# Patient Record
Sex: FEMALE | Race: WHITE | NOT HISPANIC OR LATINO | Employment: FULL TIME | URBAN - METROPOLITAN AREA
[De-identification: names, ages, dates, MRNs, and addresses within clinical notes are randomized per-mention and may not be internally consistent; named-entity substitution may affect disease eponyms.]

---

## 2017-01-04 LAB — EXTERNAL GROUP B STREP ANTIGEN: POSITIVE

## 2017-01-31 ENCOUNTER — ANESTHESIA EVENT (INPATIENT)
Dept: LABOR AND DELIVERY | Facility: HOSPITAL | Age: 32
End: 2017-01-31
Payer: COMMERCIAL

## 2017-01-31 ENCOUNTER — ANESTHESIA (INPATIENT)
Dept: LABOR AND DELIVERY | Facility: HOSPITAL | Age: 32
End: 2017-01-31
Payer: COMMERCIAL

## 2017-01-31 ENCOUNTER — HOSPITAL ENCOUNTER (INPATIENT)
Facility: HOSPITAL | Age: 32
LOS: 1 days | Discharge: HOME | End: 2017-02-01
Attending: OBSTETRICS & GYNECOLOGY | Admitting: OBSTETRICS & GYNECOLOGY
Payer: COMMERCIAL

## 2017-01-31 ENCOUNTER — HOSPITAL ENCOUNTER (OUTPATIENT)
Dept: LABOR AND DELIVERY | Facility: HOSPITAL | Age: 32
Discharge: HOME | End: 2017-01-31
Payer: COMMERCIAL

## 2017-01-31 DIAGNOSIS — Z3A.40 40 WEEKS GESTATION OF PREGNANCY: Primary | ICD-10-CM

## 2017-01-31 PROBLEM — O98.819 GROUP B STREPTOCOCCAL INFECTION DURING PREGNANCY: Status: ACTIVE | Noted: 2017-01-31

## 2017-01-31 PROBLEM — B95.1 GROUP B STREPTOCOCCAL INFECTION DURING PREGNANCY: Status: ACTIVE | Noted: 2017-01-31

## 2017-01-31 LAB
ABO GROUP BLD: NORMAL
BASE EXCESS BLDCOA CALC-SCNC: -2.3 MMOL/L (ref 3–11)
BASE EXCESS BLDCOV CALC-SCNC: -3.8 MMOL/L (ref 1–9)
BASOPHILS # BLD AUTO: 0.01 THOUSANDS/ΜL (ref 0–0.1)
BASOPHILS NFR BLD AUTO: 0 % (ref 0–1)
BLD GP AB SCN SERPL QL: NEGATIVE
EOSINOPHIL # BLD AUTO: 0.1 THOUSAND/ΜL (ref 0–0.61)
EOSINOPHIL NFR BLD AUTO: 2 % (ref 0–6)
ERYTHROCYTE [DISTWIDTH] IN BLOOD BY AUTOMATED COUNT: 14.1 % (ref 11.6–15.1)
HCO3 BLDCOA-SCNC: 25.1 MMOL/L (ref 17.3–27.3)
HCO3 BLDCOV-SCNC: 22.4 MMOL/L (ref 12.2–28.6)
HCT VFR BLD AUTO: 34.8 % (ref 34.8–46.1)
HGB BLD-MCNC: 11.9 G/DL (ref 11.5–15.4)
LYMPHOCYTES # BLD AUTO: 1.1 THOUSANDS/ΜL (ref 0.6–4.47)
LYMPHOCYTES NFR BLD AUTO: 18 % (ref 14–44)
MCH RBC QN AUTO: 30.7 PG (ref 26.8–34.3)
MCHC RBC AUTO-ENTMCNC: 34.2 G/DL (ref 31.4–37.4)
MCV RBC AUTO: 90 FL (ref 82–98)
MONOCYTES # BLD AUTO: 0.19 THOUSAND/ΜL (ref 0.17–1.22)
MONOCYTES NFR BLD AUTO: 3 % (ref 4–12)
NEUTROPHILS # BLD AUTO: 4.78 THOUSANDS/ΜL (ref 1.85–7.62)
NEUTS SEG NFR BLD AUTO: 77 % (ref 43–75)
NRBC BLD AUTO-RTO: 0 /100 WBCS
O2 CT VFR BLDCOA CALC: 4.3 ML/DL
OXYHGB MFR BLDCOA: 20.4 %
OXYHGB MFR BLDCOV: 33.5 %
PCO2 BLDCOA: 53.4 MM[HG] (ref 30–60)
PCO2 BLDCOV: 44.3 MM HG (ref 27–43)
PH BLDCOA: 7.29 [PH] (ref 7.23–7.43)
PH BLDCOV: 7.32 [PH] (ref 7.19–7.49)
PLATELET # BLD AUTO: 163 THOUSANDS/UL (ref 149–390)
PMV BLD AUTO: 10.9 FL (ref 8.9–12.7)
PO2 BLDCOA: 13.7 MM HG (ref 5–25)
PO2 BLDCOV: 17.7 MM HG (ref 15–45)
RBC # BLD AUTO: 3.88 MILLION/UL (ref 3.81–5.12)
RH BLD: POSITIVE
SAO2 % BLDCOV: 7.4 ML/DL
WBC # BLD AUTO: 6.22 THOUSAND/UL (ref 4.31–10.16)

## 2017-01-31 PROCEDURE — 0HQ9XZZ REPAIR PERINEUM SKIN, EXTERNAL APPROACH: ICD-10-PCS | Performed by: OBSTETRICS & GYNECOLOGY

## 2017-01-31 PROCEDURE — 86850 RBC ANTIBODY SCREEN: CPT | Performed by: OBSTETRICS & GYNECOLOGY

## 2017-01-31 PROCEDURE — 82805 BLOOD GASES W/O2 SATURATION: CPT | Performed by: OBSTETRICS & GYNECOLOGY

## 2017-01-31 PROCEDURE — 10907ZC DRAINAGE OF AMNIOTIC FLUID, THERAPEUTIC FROM PRODUCTS OF CONCEPTION, VIA NATURAL OR ARTIFICIAL OPENING: ICD-10-PCS | Performed by: OBSTETRICS & GYNECOLOGY

## 2017-01-31 PROCEDURE — 86901 BLOOD TYPING SEROLOGIC RH(D): CPT | Performed by: OBSTETRICS & GYNECOLOGY

## 2017-01-31 PROCEDURE — 85025 COMPLETE CBC W/AUTO DIFF WBC: CPT | Performed by: OBSTETRICS & GYNECOLOGY

## 2017-01-31 PROCEDURE — 86900 BLOOD TYPING SEROLOGIC ABO: CPT | Performed by: OBSTETRICS & GYNECOLOGY

## 2017-01-31 PROCEDURE — 3E033VJ INTRODUCTION OF OTHER HORMONE INTO PERIPHERAL VEIN, PERCUTANEOUS APPROACH: ICD-10-PCS | Performed by: OBSTETRICS & GYNECOLOGY

## 2017-01-31 PROCEDURE — 86592 SYPHILIS TEST NON-TREP QUAL: CPT | Performed by: OBSTETRICS & GYNECOLOGY

## 2017-01-31 RX ORDER — OXYCODONE HYDROCHLORIDE AND ACETAMINOPHEN 5; 325 MG/1; MG/1
1 TABLET ORAL EVERY 4 HOURS PRN
Status: DISCONTINUED | OUTPATIENT
Start: 2017-01-31 | End: 2017-02-01 | Stop reason: HOSPADM

## 2017-01-31 RX ORDER — FERROUS SULFATE 325(65) MG
325 TABLET ORAL
Status: DISCONTINUED | OUTPATIENT
Start: 2017-02-01 | End: 2017-02-01 | Stop reason: HOSPADM

## 2017-01-31 RX ORDER — IBUPROFEN 600 MG/1
600 TABLET ORAL EVERY 6 HOURS PRN
Status: DISCONTINUED | OUTPATIENT
Start: 2017-01-31 | End: 2017-02-01 | Stop reason: HOSPADM

## 2017-01-31 RX ORDER — SODIUM CHLORIDE, SODIUM LACTATE, POTASSIUM CHLORIDE, CALCIUM CHLORIDE 600; 310; 30; 20 MG/100ML; MG/100ML; MG/100ML; MG/100ML
125 INJECTION, SOLUTION INTRAVENOUS CONTINUOUS
Status: DISCONTINUED | OUTPATIENT
Start: 2017-01-31 | End: 2017-01-31

## 2017-01-31 RX ORDER — MELATONIN
2000 DAILY
Status: DISCONTINUED | OUTPATIENT
Start: 2017-02-01 | End: 2017-02-01 | Stop reason: HOSPADM

## 2017-01-31 RX ORDER — DOCUSATE SODIUM 100 MG/1
100 CAPSULE, LIQUID FILLED ORAL 2 TIMES DAILY
Status: DISCONTINUED | OUTPATIENT
Start: 2017-01-31 | End: 2017-02-01 | Stop reason: HOSPADM

## 2017-01-31 RX ORDER — SENNOSIDES 8.6 MG
1 TABLET ORAL
Status: DISCONTINUED | OUTPATIENT
Start: 2017-01-31 | End: 2017-02-01 | Stop reason: HOSPADM

## 2017-01-31 RX ORDER — DIAPER,BRIEF,INFANT-TODD,DISP
1 EACH MISCELLANEOUS AS NEEDED
Status: DISCONTINUED | OUTPATIENT
Start: 2017-01-31 | End: 2017-02-01 | Stop reason: HOSPADM

## 2017-01-31 RX ORDER — LEVOTHYROXINE SODIUM 0.03 MG/1
25 TABLET ORAL
Status: DISCONTINUED | OUTPATIENT
Start: 2017-02-01 | End: 2017-02-01 | Stop reason: HOSPADM

## 2017-01-31 RX ORDER — OXYTOCIN/RINGER'S LACTATE 30/500 ML
1-30 PLASTIC BAG, INJECTION (ML) INTRAVENOUS
Status: DISCONTINUED | OUTPATIENT
Start: 2017-01-31 | End: 2017-01-31

## 2017-01-31 RX ORDER — ROPIVACAINE HYDROCHLORIDE 2 MG/ML
INJECTION, SOLUTION EPIDURAL; INFILTRATION; PERINEURAL CONTINUOUS PRN
Status: DISCONTINUED | OUTPATIENT
Start: 2017-01-31 | End: 2017-01-31 | Stop reason: SURG

## 2017-01-31 RX ORDER — LEVOTHYROXINE SODIUM 0.03 MG/1
25 TABLET ORAL
Status: DISCONTINUED | OUTPATIENT
Start: 2017-01-31 | End: 2017-01-31

## 2017-01-31 RX ORDER — OXYCODONE HYDROCHLORIDE AND ACETAMINOPHEN 5; 325 MG/1; MG/1
2 TABLET ORAL EVERY 4 HOURS PRN
Status: DISCONTINUED | OUTPATIENT
Start: 2017-01-31 | End: 2017-02-01 | Stop reason: HOSPADM

## 2017-01-31 RX ORDER — ACETAMINOPHEN 325 MG/1
650 TABLET ORAL EVERY 6 HOURS PRN
Status: DISCONTINUED | OUTPATIENT
Start: 2017-01-31 | End: 2017-02-01 | Stop reason: HOSPADM

## 2017-01-31 RX ADMIN — DOCUSATE SODIUM 100 MG: 100 CAPSULE, LIQUID FILLED ORAL at 20:17

## 2017-01-31 RX ADMIN — ROPIVACAINE HYDROCHLORIDE 10 ML/HR: 2 INJECTION, SOLUTION EPIDURAL; INFILTRATION at 14:13

## 2017-01-31 RX ADMIN — Medication 2 MILLI-UNITS/MIN: at 09:00

## 2017-01-31 RX ADMIN — WITCH HAZEL 1 PAD: 500 SOLUTION RECTAL; TOPICAL at 20:20

## 2017-01-31 RX ADMIN — SODIUM CHLORIDE 2.5 MILLION UNITS: 9 INJECTION, SOLUTION INTRAVENOUS at 13:10

## 2017-01-31 RX ADMIN — HYDROCORTISONE 1 APPLICATION: 10 CREAM TOPICAL at 20:20

## 2017-01-31 RX ADMIN — IBUPROFEN 600 MG: 600 TABLET ORAL at 20:17

## 2017-01-31 RX ADMIN — SODIUM CHLORIDE, SODIUM LACTATE, POTASSIUM CHLORIDE, AND CALCIUM CHLORIDE 125 ML/HR: .6; .31; .03; .02 INJECTION, SOLUTION INTRAVENOUS at 13:32

## 2017-01-31 RX ADMIN — SODIUM CHLORIDE, SODIUM LACTATE, POTASSIUM CHLORIDE, AND CALCIUM CHLORIDE 999 ML/HR: .6; .31; .03; .02 INJECTION, SOLUTION INTRAVENOUS at 08:20

## 2017-01-31 RX ADMIN — SODIUM CHLORIDE 5 MILLION UNITS: 0.9 INJECTION, SOLUTION INTRAVENOUS at 08:49

## 2017-01-31 RX ADMIN — BENZOCAINE AND MENTHOL 1 APPLICATION: 20; .5 SPRAY TOPICAL at 20:20

## 2017-01-31 RX ADMIN — LEVOTHYROXINE SODIUM 25 MCG: 25 TABLET ORAL at 08:29

## 2017-02-01 VITALS
WEIGHT: 156 LBS | RESPIRATION RATE: 18 BRPM | DIASTOLIC BLOOD PRESSURE: 71 MMHG | HEART RATE: 87 BPM | TEMPERATURE: 98.8 F | HEIGHT: 61 IN | BODY MASS INDEX: 29.45 KG/M2 | OXYGEN SATURATION: 99 % | SYSTOLIC BLOOD PRESSURE: 112 MMHG

## 2017-02-01 LAB
BASOPHILS # BLD AUTO: 0.01 THOUSANDS/ΜL (ref 0–0.1)
BASOPHILS NFR BLD AUTO: 0 % (ref 0–1)
EOSINOPHIL # BLD AUTO: 0.09 THOUSAND/ΜL (ref 0–0.61)
EOSINOPHIL NFR BLD AUTO: 1 % (ref 0–6)
ERYTHROCYTE [DISTWIDTH] IN BLOOD BY AUTOMATED COUNT: 14.3 % (ref 11.6–15.1)
HCT VFR BLD AUTO: 32.1 % (ref 34.8–46.1)
HGB BLD-MCNC: 10.9 G/DL (ref 11.5–15.4)
LYMPHOCYTES # BLD AUTO: 1.24 THOUSANDS/ΜL (ref 0.6–4.47)
LYMPHOCYTES NFR BLD AUTO: 16 % (ref 14–44)
MCH RBC QN AUTO: 31.1 PG (ref 26.8–34.3)
MCHC RBC AUTO-ENTMCNC: 34 G/DL (ref 31.4–37.4)
MCV RBC AUTO: 92 FL (ref 82–98)
MONOCYTES # BLD AUTO: 0.62 THOUSAND/ΜL (ref 0.17–1.22)
MONOCYTES NFR BLD AUTO: 8 % (ref 4–12)
NEUTROPHILS # BLD AUTO: 5.61 THOUSANDS/ΜL (ref 1.85–7.62)
NEUTS SEG NFR BLD AUTO: 75 % (ref 43–75)
NRBC BLD AUTO-RTO: 0 /100 WBCS
PLATELET # BLD AUTO: 127 THOUSANDS/UL (ref 149–390)
PMV BLD AUTO: 11.5 FL (ref 8.9–12.7)
RBC # BLD AUTO: 3.51 MILLION/UL (ref 3.81–5.12)
RPR SER QL: NORMAL
WBC # BLD AUTO: 7.6 THOUSAND/UL (ref 4.31–10.16)

## 2017-02-01 PROCEDURE — 85025 COMPLETE CBC W/AUTO DIFF WBC: CPT | Performed by: OBSTETRICS & GYNECOLOGY

## 2017-02-01 RX ORDER — ACETAMINOPHEN 325 MG/1
650 TABLET ORAL EVERY 6 HOURS PRN
Qty: 30 TABLET | Refills: 0
Start: 2017-02-01 | End: 2017-03-03

## 2017-02-01 RX ORDER — DIAPER,BRIEF,INFANT-TODD,DISP
1 EACH MISCELLANEOUS AS NEEDED
Qty: 30 G | Refills: 0
Start: 2017-02-01 | End: 2019-11-12 | Stop reason: ALTCHOICE

## 2017-02-01 RX ORDER — IBUPROFEN 600 MG/1
600 TABLET ORAL EVERY 6 HOURS PRN
Qty: 30 TABLET | Refills: 0
Start: 2017-02-01 | End: 2019-11-12 | Stop reason: ALTCHOICE

## 2017-02-01 RX ORDER — VALACYCLOVIR HYDROCHLORIDE 1 G/1
2000 TABLET, FILM COATED ORAL EVERY 12 HOURS
Refills: 0
Start: 2017-02-01 | End: 2019-11-12

## 2017-02-01 RX ORDER — VALACYCLOVIR HYDROCHLORIDE 500 MG/1
2000 TABLET, FILM COATED ORAL EVERY 12 HOURS
Status: DISCONTINUED | OUTPATIENT
Start: 2017-02-01 | End: 2017-02-01 | Stop reason: HOSPADM

## 2017-02-01 RX ADMIN — VALACYCLOVIR 2000 MG: 500 TABLET, FILM COATED ORAL at 10:59

## 2017-02-01 RX ADMIN — DOCUSATE SODIUM 100 MG: 100 CAPSULE, LIQUID FILLED ORAL at 08:12

## 2017-02-01 RX ADMIN — DOCUSATE SODIUM 100 MG: 100 CAPSULE, LIQUID FILLED ORAL at 17:48

## 2017-02-01 RX ADMIN — Medication 325 MG: at 06:30

## 2017-02-01 RX ADMIN — IBUPROFEN 600 MG: 600 TABLET ORAL at 14:37

## 2017-02-01 RX ADMIN — IBUPROFEN 600 MG: 600 TABLET ORAL at 08:12

## 2017-02-01 RX ADMIN — LEVOTHYROXINE SODIUM 25 MCG: 25 TABLET ORAL at 06:30

## 2017-02-08 ENCOUNTER — TELEPHONE (OUTPATIENT)
Dept: LABOR AND DELIVERY | Facility: HOSPITAL | Age: 32
End: 2017-02-08

## 2017-02-10 LAB — PLACENTA IN STORAGE: NORMAL

## 2017-02-20 ENCOUNTER — GENERIC CONVERSION - ENCOUNTER (OUTPATIENT)
Dept: OTHER | Facility: OTHER | Age: 32
End: 2017-02-20

## 2018-01-11 NOTE — RESULT NOTES
Verified Results  (1) PLACENTA IN STORAGE [id number] 06:46PM Echo Ramos     Test Name Result Flag Reference   PLACENTA IN STORAGE Placenta Discarded

## 2019-11-12 ENCOUNTER — ANNUAL EXAM (OUTPATIENT)
Dept: OBGYN CLINIC | Facility: CLINIC | Age: 34
End: 2019-11-12
Payer: COMMERCIAL

## 2019-11-12 VITALS
HEIGHT: 61 IN | BODY MASS INDEX: 22.28 KG/M2 | WEIGHT: 118 LBS | DIASTOLIC BLOOD PRESSURE: 60 MMHG | SYSTOLIC BLOOD PRESSURE: 100 MMHG

## 2019-11-12 DIAGNOSIS — Z01.419 ENCOUNTER FOR WELL WOMAN EXAM: Primary | ICD-10-CM

## 2019-11-12 DIAGNOSIS — Z12.4 ROUTINE CERVICAL SMEAR: ICD-10-CM

## 2019-11-12 PROCEDURE — 99395 PREV VISIT EST AGE 18-39: CPT | Performed by: PHYSICIAN ASSISTANT

## 2019-11-12 NOTE — PROGRESS NOTES
Assessment/Plan:    No problem-specific Assessment & Plan notes found for this encounter  Diagnoses and all orders for this visit:    Encounter for well woman exam    Other orders  -     Discontinue: Cholecalciferol 25 MCG (1000 UT) tablet; Take 1,000 Units by mouth daily          Subjective:      Patient ID: Arya King is a 37 y o  female  Pt presents for her annual exam today--  She has no complaints  No changes except benign liver lesions dx on imaging few months ago--[facility]  She has regular bleeding, no pelvic pain  Bowel and bladder are regular  No breast concerns today    pap today  Pt's request        The following portions of the patient's history were reviewed and updated as appropriate: allergies, current medications, past family history, past medical history, past social history, past surgical history and problem list     Review of Systems   Constitutional: Negative for chills, fever and unexpected weight change  Gastrointestinal: Negative for abdominal pain, blood in stool, constipation and diarrhea  Genitourinary: Negative  Objective:      /60 (BP Location: Left arm, Patient Position: Sitting, Cuff Size: Standard)   Ht 5' 1" (1 549 m)   Wt 53 5 kg (118 lb)   LMP 10/16/2019 (Exact Date)   BMI 22 30 kg/m²          Physical Exam   Constitutional: She appears well-developed and well-nourished  HENT:   Head: Normocephalic and atraumatic  Neck: Normal range of motion  Pulmonary/Chest: Right breast exhibits no inverted nipple, no mass, no nipple discharge and no skin change  Left breast exhibits no inverted nipple, no mass, no nipple discharge and no skin change  Abdominal: Soft  Genitourinary: Vagina normal and uterus normal  Pelvic exam was performed with patient supine  There is no rash, tenderness or lesion on the right labia  There is no rash, tenderness or lesion on the left labia   Cervix exhibits no motion tenderness, no discharge and no friability  Right adnexum displays no mass, no tenderness and no fullness  Left adnexum displays no mass, no tenderness and no fullness  Lymphadenopathy: No inguinal adenopathy noted on the right or left side  Nursing note and vitals reviewed

## 2019-11-12 NOTE — PROGRESS NOTES
The patient is here for a yearly  The patient is not due for a pap smear but her insurance covers the pap smear every year so she is requesting one  The patient has regular periods  No vaginal or urinary issues  No breast concerns

## 2019-11-22 LAB — THIN PREP CVX: NORMAL

## 2020-12-14 ENCOUNTER — ANNUAL EXAM (OUTPATIENT)
Dept: OBGYN CLINIC | Facility: CLINIC | Age: 35
End: 2020-12-14
Payer: COMMERCIAL

## 2020-12-14 VITALS
HEIGHT: 61 IN | SYSTOLIC BLOOD PRESSURE: 110 MMHG | BODY MASS INDEX: 22.84 KG/M2 | WEIGHT: 121 LBS | DIASTOLIC BLOOD PRESSURE: 66 MMHG

## 2020-12-14 DIAGNOSIS — Z01.419 CERVICAL SMEAR, AS PART OF ROUTINE GYNECOLOGICAL EXAMINATION: ICD-10-CM

## 2020-12-14 DIAGNOSIS — Z12.39 ENCOUNTER FOR SCREENING BREAST EXAMINATION: ICD-10-CM

## 2020-12-14 DIAGNOSIS — Z01.419 ENCOUNTER FOR WELL WOMAN EXAM: Primary | ICD-10-CM

## 2020-12-14 PROCEDURE — G0145 SCR C/V CYTO,THINLAYER,RESCR: HCPCS | Performed by: PHYSICIAN ASSISTANT

## 2020-12-14 PROCEDURE — 99395 PREV VISIT EST AGE 18-39: CPT | Performed by: PHYSICIAN ASSISTANT

## 2020-12-18 LAB
LAB AP GYN PRIMARY INTERPRETATION: NORMAL
Lab: NORMAL

## 2021-01-24 ENCOUNTER — OFFICE VISIT (OUTPATIENT)
Dept: URGENT CARE | Age: 36
End: 2021-01-24
Payer: COMMERCIAL

## 2021-01-24 VITALS
RESPIRATION RATE: 16 BRPM | WEIGHT: 117 LBS | HEIGHT: 61 IN | OXYGEN SATURATION: 100 % | BODY MASS INDEX: 22.09 KG/M2 | TEMPERATURE: 97.4 F | HEART RATE: 62 BPM

## 2021-01-24 DIAGNOSIS — Z20.822 EXPOSURE TO COVID-19 VIRUS: ICD-10-CM

## 2021-01-24 DIAGNOSIS — B34.9 VIRAL SYNDROME: Primary | ICD-10-CM

## 2021-01-24 PROCEDURE — U0005 INFEC AGEN DETEC AMPLI PROBE: HCPCS | Performed by: PHYSICIAN ASSISTANT

## 2021-01-24 PROCEDURE — 99212 OFFICE O/P EST SF 10 MIN: CPT | Performed by: PHYSICIAN ASSISTANT

## 2021-01-24 PROCEDURE — U0003 INFECTIOUS AGENT DETECTION BY NUCLEIC ACID (DNA OR RNA); SEVERE ACUTE RESPIRATORY SYNDROME CORONAVIRUS 2 (SARS-COV-2) (CORONAVIRUS DISEASE [COVID-19]), AMPLIFIED PROBE TECHNIQUE, MAKING USE OF HIGH THROUGHPUT TECHNOLOGIES AS DESCRIBED BY CMS-2020-01-R: HCPCS | Performed by: PHYSICIAN ASSISTANT

## 2021-01-24 RX ORDER — CITALOPRAM 10 MG/1
10 TABLET ORAL DAILY
COMMUNITY

## 2021-01-24 NOTE — PROGRESS NOTES
[address] [employer] [facility]        NAME: Xiomy Donahue is a 38 y o  female  : 1985    MRN: [id number]  DATE: 2021  TIME: 1:48 PM    Assessment and Plan   Viral syndrome [B34 9]  1  Viral syndrome  [facility] Coron[facility]ov[facility]   2  Exposure to COVID-19 virus           Patient Instructions     COVID swab collected today, test results pending  Check MyChart and call in 2-3 days for test results  Results may take up to 7-10 days  Quarantine guidelines discussed  Follow-up with PCP in the next 1-2 days for re-evaluation  Go to the ED if any fevers, unable to stay hydrated, abdominal pain, chest pain, shortness of breath, wheezing, chest tightness, cough or cold symptoms, new or worsening symptoms or other concerning symptoms  Chief Complaint     Chief Complaint   Patient presents with    COVID testing     Pt complaining of congestion and headache x1 day  Her  tested positive for COVID  History of Present Illness       77-year-old female presents for COVID testing  States she has had nasal congestion, runny nose and a mild sinus congestion headache since yesterday  States her  tested positive for Coronavirus/had a direct exposure  Last contact with  was yesterday- he has now been quarantining  Denies any fever, chills, loss of taste/, cough or other cold-like symptoms  Denies any chest pain, shortness of breath, chest tightness, GI/ symptoms or other complaints  Review of Systems   Review of Systems   Constitutional: Negative for activity change, appetite change, chills, fatigue and fever  HENT: Positive for congestion and rhinorrhea  Negative for ear pain, facial swelling, postnasal drip, sinus pressure, sore throat, trouble swallowing and voice change  Eyes: Negative for discharge, itching and visual disturbance  Respiratory: Negative for cough, chest tightness, shortness of breath and wheezing      Cardiovascular: Negative for chest pain  Gastrointestinal: Negative for abdominal pain, diarrhea, nausea and vomiting  Musculoskeletal: Negative for back pain and neck pain  Skin: Negative for rash  Neurological: Positive for headaches (mild, sinus congestion headache)  Negative for dizziness, syncope, weakness and numbness  All other systems reviewed and are negative          Current Medications       Current Outpatient Medications:     citalopram (CeleXA) 10 mg tablet, Take 10 mg by mouth daily, Disp: , Rfl:     albuterol (PROVENTIL HFA,VENTOLIN HFA) 90 mcg/act inhaler, Inhale 2 puffs every 6 (six) hours as needed for wheezing, Disp: , Rfl:     cholecalciferol (VITAMIN D3) 1,000 units tablet, Take 2,000 Units by mouth daily, Disp: , Rfl:     levothyroxine 25 mcg tablet, Take 25 mcg by mouth daily, Disp: , Rfl:     valACYclovir (VALTREX) 1,000 mg tablet, Take 2 tablets by mouth every 12 (twelve) hours for 1 dose, Disp: , Rfl: 0    Current Allergies     Allergies as of 2021 - Reviewed 2021   Allergen Reaction Noted    Cat hair extract  2019            The following portions of the patient's history were reviewed and updated as appropriate: allergies, current medications, past family history, past medical history, past social history, past surgical history and problem list      Past Medical History:   Diagnosis Date    Asthma     Disease of thyroid gland     Esophageal hernia      2 para 2     HPV in female     Papanicolaou smear 10/24/2018    NEG    Varicella     needs vaccine       Past Surgical History:   Procedure Laterality Date    COLONOSCOPY      COLPOSCOPY      FERTILITY SURGERY      GYNECOLOGIC CRYOSURGERY      INDUCED       MYRINGOTOMY      VAGINAL DELIVERY         Family History   Problem Relation Age of Onset    Arthritis Mother     Cancer Father     COPD Father     Depression Father     Diabetes Father     Mental illness Father     Leukemia Father    Barron Gallardo Cancer Maternal Uncle     Cancer Paternal Aunt     Mental illness Paternal Aunt     Mental illness Paternal Uncle     Arthritis Maternal Grandmother     Heart disease Maternal Grandmother     Miscarriages / Stillbirths Maternal Grandmother     Cancer Maternal Grandfather     Diabetes Maternal Grandfather     Hearing loss Maternal Grandfather     Stroke Maternal Grandfather     Bone cancer Maternal Grandfather [de-identified]    Hearing loss Paternal Grandfather     Stroke Paternal Grandfather     Cancer Maternal Uncle     Cancer Paternal Uncle          Medications have been verified  Objective   Pulse 62   Temp (!) 97 4 °F (36 3 °C) (Temporal)   Resp 16   Ht 5' 1" (1 549 m)   Wt 53 1 kg (117 lb)   SpO2 100%   BMI 22 11 kg/m²        Physical Exam     Physical Exam  Vitals signs and nursing note reviewed  Constitutional:       General: She is not in acute distress  Appearance: Normal appearance  She is well-developed  She is not toxic-appearing  HENT:      Head: Normocephalic and atraumatic  Right Ear: Tympanic membrane normal       Left Ear: Tympanic membrane normal       Mouth/Throat:      Mouth: Mucous membranes are moist       Pharynx: Oropharynx is clear  Uvula midline  No oropharyngeal exudate, posterior oropharyngeal erythema or uvula swelling  Eyes:      Extraocular Movements: Extraocular movements intact  Pupils: Pupils are equal, round, and reactive to light  Neck:      Musculoskeletal: Normal range of motion and neck supple  No neck rigidity  Cardiovascular:      Rate and Rhythm: Normal rate and regular rhythm  Heart sounds: Normal heart sounds  Pulmonary:      Effort: Pulmonary effort is normal  No respiratory distress  Breath sounds: Normal breath sounds  No wheezing  Skin:     Capillary Refill: Capillary refill takes less than 2 seconds  Neurological:      Mental Status: She is alert and oriented to person, place, and time     Psychiatric: Behavior: Behavior normal

## 2021-01-24 NOTE — PATIENT INSTRUCTIONS
COVID swab collected today, test results pending  Check MyChart and call in 2-3 days for test results  Results may take up to 7-10 days  Quarantine guidelines discussed  Follow-up with PCP in the next 1-2 days for re-evaluation  Go to the ED if any fevers, unable to stay hydrated, abdominal pain, chest pain, shortness of breath, wheezing, chest tightness, cough or cold symptoms, new or worsening symptoms or other concerning symptoms  [address]    Your healthcare provider and/or public health staff have evaluated you and have determined that you do not need to remain in the hospital at this time  At this time you can be isolated at home where you will be monitored by staff from your local or state health department  You should carefully follow the prevention and isolation steps below until a healthcare provider or local or state health department says that you can return to your normal activities  Stay home except to get medical care    People who are mildly ill with COVID-19 are able to isolate at home during their illness  You should restrict activities outside your home, except for getting medical care  Do not go to work, school, or public areas  Avoid using public transportation, ride-sharing, or taxis  Separate yourself from other people and animals in your home    People: As much as possible, you should stay in a specific room and away from other people in your home  Also, you should use a separate bathroom, if available  Animals: You should restrict contact with pets and other animals while you are sick with COVID-19, just like you would around other people  Although there have not been reports of pets or other animals becoming sick with COVID-19, it is still recommended that people sick with COVID-19 limit contact with animals until more information is known about the virus  When possible, have another member of your household care for your animals while you are sick   If you are sick with COVID-19, avoid contact with your pet, including petting, snuggling, being kissed or licked, and sharing food  If you must care for your pet or be around animals while you are sick, wash your hands before and after you interact with pets and wear a facemask  See COVID-19 and Animals for more information  Call ahead before visiting your doctor    If you have a medical appointment, call the healthcare provider and tell them that you have or may have COVID-19  This will help the healthcare providers office take steps to keep other people from getting infected or exposed  Wear a facemask    You should wear a facemask when you are around other people (e g , sharing a room or vehicle) or pets and before you enter a healthcare providers office  If you are not able to wear a facemask (for example, because it causes trouble breathing), then people who live with you should not stay in the same room with you, or they should wear a facemask if they enter your room  Cover your coughs and sneezes    Cover your mouth and nose with a tissue when you cough or sneeze  Throw used tissues in a lined trash can  Immediately wash your hands with soap and water for at least 20 seconds or, if soap and water are not available, clean your hands with an alcohol-based hand  that contains at least 60% alcohol  Clean your hands often    Wash your hands often with soap and water for at least 20 seconds, especially after blowing your nose, coughing, or sneezing; going to the bathroom; and before eating or preparing food  If soap and water are not readily available, use an alcohol-based hand  with at least 60% alcohol, covering all surfaces of your hands and rubbing them together until they feel dry  Soap and water are the best option if hands are visibly dirty  Avoid touching your eyes, nose, and mouth with unwashed hands      Avoid sharing personal household items    You should not share dishes, drinking glasses, cups, eating utensils, towels, or bedding with other people or pets in your home  After using these items, they should be washed thoroughly with soap and water  Clean all high-touch surfaces everyday    High touch surfaces include counters, tabletops, doorknobs, bathroom fixtures, toilets, phones, keyboards, tablets, and bedside tables  Also, clean any surfaces that may have blood, stool, or body fluids on them  Use a household cleaning spray or wipe, according to the label instructions  Labels contain instructions for safe and effective use of the cleaning product including precautions you should take when applying the product, such as wearing gloves and making sure you have good ventilation during use of the product  Monitor your symptoms    Seek prompt medical attention if your illness is worsening (e g , difficulty breathing)  Before seeking care, call your healthcare provider and tell them that you have, or are being evaluated for, COVID-19  Put on a facemask before you enter the facility  These steps will help the healthcare providers office to keep other people in the office or waiting room from getting infected or exposed  Ask your healthcare provider to call the local or [facility] health department  Persons who are placed under active monitoring or facilitated self-monitoring should follow instructions provided by their local health department or occupational health professionals, as appropriate  If you have a medical emergency and need to call 911, notify the dispatch personnel that you have, or are being evaluated for COVID-19  If possible, put on a facemask before emergency medical services arrive      Discontinuing home isolation    Patients with confirmed COVID-19 should remain under home isolation precautions until the following conditions are met:   - They have had no fever for at least 24 hours (that is one full day of no fever without the use medicine that reduces fevers)  AND  - other symptoms have improved (for example, when their cough or shortness of breath have improved)  AND  - If had mild or moderate illness, at least 10 days have passed since their symptoms first appeared or if severe illness (needed oxygen) or immunosuppressed, at least 20 days have passed since symptoms first appeared  Patients with confirmed COVID-19 should also notify close contacts (including their workplace) and ask that they self-quarantine  Currently, close contact is defined as being within 6 feet for 15 minutes or more from the period 24 hours starting 48 hours before symptom onset to the time at which the patient went into isolation  Close contacts of patients diagnosed with COVID-19 should be instructed by the patient to self-quarantine for 14 days from the last time of their last contact with the patient       Source: [employer]

## 2021-01-25 LAB — SARS-COV-2 RNA RESP QL NAA+PROBE: NEGATIVE

## 2022-01-26 ENCOUNTER — ANNUAL EXAM (OUTPATIENT)
Dept: OBGYN CLINIC | Facility: CLINIC | Age: 37
End: 2022-01-26
Payer: COMMERCIAL

## 2022-01-26 VITALS
SYSTOLIC BLOOD PRESSURE: 100 MMHG | BODY MASS INDEX: 22.66 KG/M2 | WEIGHT: 120 LBS | HEIGHT: 61 IN | DIASTOLIC BLOOD PRESSURE: 60 MMHG

## 2022-01-26 DIAGNOSIS — Z01.419 ENCOUNTER FOR ANNUAL ROUTINE GYNECOLOGICAL EXAMINATION: ICD-10-CM

## 2022-01-26 DIAGNOSIS — Z01.419 ROUTINE GYNECOLOGICAL EXAMINATION: Primary | ICD-10-CM

## 2022-01-26 DIAGNOSIS — Z11.51 SCREENING FOR HPV (HUMAN PAPILLOMAVIRUS): ICD-10-CM

## 2022-01-26 PROCEDURE — 99395 PREV VISIT EST AGE 18-39: CPT | Performed by: OBSTETRICS & GYNECOLOGY

## 2022-01-26 PROCEDURE — G0145 SCR C/V CYTO,THINLAYER,RESCR: HCPCS | Performed by: OBSTETRICS & GYNECOLOGY

## 2022-01-26 PROCEDURE — G0476 HPV COMBO ASSAY CA SCREEN: HCPCS | Performed by: OBSTETRICS & GYNECOLOGY

## 2022-01-26 NOTE — PROGRESS NOTES
Assessment/Plan:    Normal breast and gyn exam  COVID vaccine    Plan:  Check Pap smear  Continue healthy diet exercise  Continue vitamin-C vitamin-D and zinc     Subjective:      Patient ID: Jamee Monreal is a 43 y o  female status for yearly exam no complaints  Patient has regular menstrual cycles  Patient denies any breast bowel or bladder issues  No change in family history  Medications reviewed  Review of Systems   Constitutional: Negative  Negative for fatigue, fever and unexpected weight change  HENT: Negative  Eyes: Negative  Respiratory: Negative  Negative for chest tightness, shortness of breath, wheezing and stridor  Cardiovascular: Negative  Negative for chest pain, palpitations and leg swelling  Gastrointestinal: Negative  Negative for abdominal pain, blood in stool, diarrhea, nausea, rectal pain and vomiting  Endocrine: Negative  Genitourinary: Negative for dysuria, frequency, vaginal bleeding, vaginal discharge and vaginal pain  Musculoskeletal: Negative  Skin: Negative  Allergic/Immunologic: Negative  Neurological: Negative  Hematological: Negative  Psychiatric/Behavioral: Negative  All other systems reviewed and are negative  Objective:      /60   Ht 5' 1" (1 549 m)   Wt 54 4 kg (120 lb)   LMP 2022 (Exact Date)   BMI 22 67 kg/m²          Physical Exam  Constitutional:       Appearance: She is well-developed  HENT:      Head: Normocephalic and atraumatic  Neck:      Thyroid: No thyromegaly  Trachea: No tracheal deviation  Cardiovascular:      Rate and Rhythm: Normal rate and regular rhythm  Heart sounds: Normal heart sounds  Pulmonary:      Effort: Pulmonary effort is normal  No respiratory distress  Breath sounds: Normal breath sounds  No stridor  No wheezing or rales  Chest:      Chest wall: No tenderness     Breasts: Breasts are symmetrical       Right: No inverted nipple, mass, nipple discharge, skin change or tenderness  Left: No inverted nipple, mass, nipple discharge, skin change or tenderness  Abdominal:      General: Bowel sounds are normal  There is no distension  Palpations: Abdomen is soft  There is no mass  Tenderness: There is no abdominal tenderness  There is no guarding or rebound  Hernia: No hernia is present  There is no hernia in the left inguinal area  Genitourinary:     Labia:         Right: No rash, tenderness, lesion or injury  Left: No rash, tenderness, lesion or injury  Vagina: Normal  No signs of injury and foreign body  No vaginal discharge, erythema, tenderness or bleeding  Cervix: No cervical motion tenderness, discharge or friability  Uterus: Not deviated, not enlarged, not fixed and not tender  Adnexa:         Right: No mass, tenderness or fullness  Left: No mass, tenderness or fullness  Rectum: No mass, anal fissure, external hemorrhoid or internal hemorrhoid  Musculoskeletal:      Cervical back: Normal range of motion and neck supple  Lymphadenopathy:      Lower Body: No right inguinal adenopathy  No left inguinal adenopathy  Skin:     General: Skin is warm and dry  Neurological:      Mental Status: She is alert and oriented to person, place, and time  Psychiatric:         Behavior: Behavior normal          Thought Content:  Thought content normal          Judgment: Judgment normal

## 2022-01-26 NOTE — PROGRESS NOTES
The patient is here for a yearly  The patient's insurance covers the pap smear every year  11/12/19 Normal Pap  10/24/18 Normal Pap, Negative HPV  7/6/16 Normal Pap, Negative HPV  The patient has regular periods  No vaginal, bowel, bladder or breast problems

## 2022-01-28 LAB
HPV HR 12 DNA CVX QL NAA+PROBE: NEGATIVE
HPV16 DNA CVX QL NAA+PROBE: NEGATIVE
HPV18 DNA CVX QL NAA+PROBE: NEGATIVE

## 2022-02-02 LAB
LAB AP GYN PRIMARY INTERPRETATION: NORMAL
Lab: NORMAL

## 2023-01-30 ENCOUNTER — ANNUAL EXAM (OUTPATIENT)
Dept: GYNECOLOGY | Facility: CLINIC | Age: 38
End: 2023-01-30

## 2023-01-30 DIAGNOSIS — B96.89 BV (BACTERIAL VAGINOSIS): ICD-10-CM

## 2023-01-30 DIAGNOSIS — Z12.31 SCREENING MAMMOGRAM FOR BREAST CANCER: Primary | ICD-10-CM

## 2023-01-30 DIAGNOSIS — Z11.3 SCREENING FOR STDS (SEXUALLY TRANSMITTED DISEASES): ICD-10-CM

## 2023-01-30 DIAGNOSIS — N89.8 VAGINAL ODOR: ICD-10-CM

## 2023-01-30 DIAGNOSIS — N76.0 BV (BACTERIAL VAGINOSIS): ICD-10-CM

## 2023-01-30 DIAGNOSIS — Z01.419 ENCOUNTER FOR ANNUAL ROUTINE GYNECOLOGICAL EXAMINATION: ICD-10-CM

## 2023-01-30 DIAGNOSIS — N76.0 ACUTE VAGINITIS: ICD-10-CM

## 2023-01-30 NOTE — PROGRESS NOTES
Assessment/Plan:  Normal breast and GYN exam  Vaginal odor times several months  Regular menstrual cycles  Normal Pap smear negative HPV 2022  Tobacco abuse 4 cigarettes a day  No COVID infection or vaccination  No flu vaccine this year    Plan: Rx baseline mammogram   Check vaginal culture  Encouraged to quit smoking  Continue healthy diet and exercise  Subjective: [id number]: Last delivery      Patient ID: Tasha Madrid is a 42 y o  female presents for annual exam with no complaints except on vaginal odor times several months  Denies any pelvic pain or vaginal bleeding  Presently sexually active but not having any intercourse  Using any form of birth control  , but has  onesexual  partner  Denies any pelvic pain or abnormal bleeding  Denies any breast bowel or bladder issues  Exercising daily  Presently working full-time from home but will be going back to work using a hybrid model  No change in family history  Father (leukemia, lung cancer and kidney cancer) maternal uncle lung cancer  [place] reviewed  Review of Systems   Constitutional: Negative  Negative for fatigue, fever and unexpected weight change  HENT: Negative  Eyes: Negative  Respiratory: Negative  Negative for chest tightness, shortness of breath, wheezing and stridor  Cardiovascular: Negative  Negative for chest pain, palpitations and leg swelling  Gastrointestinal: Negative  Negative for abdominal pain, blood in stool, diarrhea, nausea, rectal pain and vomiting  Endocrine: Negative  Genitourinary: Negative for dysuria, frequency, vaginal bleeding, vaginal discharge and vaginal pain  Musculoskeletal: Negative  Skin: Negative  Allergic/Immunologic: Negative  Neurological: Negative  Hematological: Negative  Psychiatric/Behavioral: Negative  All other systems reviewed and are negative  Objective: There were no vitals taken for this visit           Physical Exam  Constitutional:       Appearance: She is well-developed  HENT:      Head: Normocephalic and atraumatic  Neck:      Thyroid: No thyromegaly  Trachea: No tracheal deviation  Cardiovascular:      Rate and Rhythm: Normal rate and regular rhythm  Heart sounds: Normal heart sounds  Pulmonary:      Effort: Pulmonary effort is normal  No respiratory distress  Breath sounds: Normal breath sounds  No stridor  No wheezing or rales  Chest:      Chest wall: No tenderness  Breasts:     Breasts are symmetrical       Right: No inverted nipple, mass, nipple discharge, skin change or tenderness  Left: No inverted nipple, mass, nipple discharge, skin change or tenderness  Abdominal:      General: Bowel sounds are normal  There is no distension  Palpations: Abdomen is soft  There is no mass  Tenderness: There is no abdominal tenderness  There is no guarding or rebound  Hernia: No hernia is present  There is no hernia in the left inguinal area  Genitourinary:     Labia:         Right: No rash, tenderness, lesion or injury  Left: No rash, tenderness, lesion or injury  Vagina: Normal  No signs of injury and foreign body  No vaginal discharge, erythema, tenderness or bleeding  Cervix: No cervical motion tenderness, discharge or friability  Uterus: Not deviated, not enlarged, not fixed and not tender  Adnexa:         Right: No mass, tenderness or fullness  Left: No mass, tenderness or fullness  Rectum: No mass, anal fissure, external hemorrhoid or internal hemorrhoid  Musculoskeletal:      Cervical back: Normal range of motion and neck supple  Lymphadenopathy:      Lower Body: No right inguinal adenopathy  No left inguinal adenopathy  Skin:     General: Skin is warm and dry  Neurological:      Mental Status: She is alert and oriented to person, place, and time     Psychiatric:         Behavior: Behavior normal          Thought Content:  Thought content normal          Judgment: Judgment normal

## 2023-02-03 DIAGNOSIS — B37.9 YEAST INFECTION: Primary | ICD-10-CM

## 2023-02-03 NOTE — TELEPHONE ENCOUNTER
The patient was advised of vaginal culture which came back positive for yeast  Dr Adina Templeton had prescribed the patient terazol 7  Please sign off on order

## 2023-02-04 LAB
BACTERIA GENITAL AEROBE CULT: ABNORMAL
BACTERIA GENITAL AEROBE CULT: ABNORMAL

## 2023-05-24 ENCOUNTER — TELEPHONE (OUTPATIENT)
Dept: PSYCHIATRY | Facility: CLINIC | Age: 38
End: 2023-05-24

## 2023-05-24 NOTE — TELEPHONE ENCOUNTER
Pt has been removed from med mgmt excel wait list and has been added to epic wait list for [facility] mgmt

## 2023-08-11 ENCOUNTER — OFFICE VISIT (OUTPATIENT)
Dept: GYNECOLOGY | Facility: CLINIC | Age: 38
End: 2023-08-11
Payer: COMMERCIAL

## 2023-08-11 VITALS
SYSTOLIC BLOOD PRESSURE: 112 MMHG | BODY MASS INDEX: 23.56 KG/M2 | WEIGHT: 120 LBS | HEIGHT: 60 IN | DIASTOLIC BLOOD PRESSURE: 80 MMHG

## 2023-08-11 DIAGNOSIS — Z11.8 SCREENING FOR CHLAMYDIAL DISEASE: ICD-10-CM

## 2023-08-11 DIAGNOSIS — Z11.3 SCREENING FOR STDS (SEXUALLY TRANSMITTED DISEASES): ICD-10-CM

## 2023-08-11 DIAGNOSIS — N89.8 VAGINAL IRRITATION: ICD-10-CM

## 2023-08-11 DIAGNOSIS — B37.49 GENITAL CANDIDIASIS: ICD-10-CM

## 2023-08-11 DIAGNOSIS — N89.8 VAGINAL ODOR: Primary | ICD-10-CM

## 2023-08-11 DIAGNOSIS — N72 CERVICITIS: ICD-10-CM

## 2023-08-11 DIAGNOSIS — N76.0 ACUTE VAGINITIS: ICD-10-CM

## 2023-08-11 PROCEDURE — 99213 OFFICE O/P EST LOW 20 MIN: CPT | Performed by: PHYSICIAN ASSISTANT

## 2023-08-14 LAB
A VAGINAE DNA VAG NAA+PROBE-LOG#: ABNORMAL
A VAGINAE DNA VAG QL NAA+PROBE: DETECTED
BVAB2 DNA VAG QL NAA+PROBE: DETECTED
C TRACH DNA SPEC QL PROBE+SIG AMP: NOT DETECTED
G VAGINALIS DNA SPEC QL NAA+PROBE: DETECTED
G VAGINALIS DNA VAG NAA+PROBE-LOG#: >5.25
HSV1 DNA SPEC QL NAA+PROBE: NOT DETECTED
HSV2 DNA SPEC QL NAA+PROBE: NOT DETECTED
LACTOBACILLUS DNA VAG NAA+PROBE-LOG#: <3.25
LACTOBACILLUS DNA VAG NAA+PROBE-LOG#: NOT DETECTED
M GENITALIUM DNA SPEC QL NAA+PROBE: NOT DETECTED
MEGA1 DNA VAG QL NAA+PROBE: DETECTED
N GONORRHOEA DNA SPEC QL NAA+PROBE: NOT DETECTED
SL AMB C.ALBICANS BY PCR: NOT DETECTED
SL AMB CANDIDA GENUS: NOT DETECTED
T VAGINALIS RRNA SPEC QL NAA+PROBE: NOT DETECTED
T VAGINALIS RRNA SPEC QL NAA+PROBE: NOT DETECTED

## 2023-08-14 NOTE — PROGRESS NOTES
Assessment/Plan:    No problem-specific Assessment & Plan notes found for this encounter. Diagnoses and all orders for this visit:    Vaginal odor  -     GP Vaginitis/Vaginosis W/O PAP W/O HPV  Expanded  -     GP Cervicitis W/O PAP W/O HPV PLUS    Vaginal irritation    Acute vaginitis  -     GP Vaginitis/Vaginosis W/O PAP W/O HPV  Expanded    Genital candidiasis  -     GP Vaginitis/Vaginosis W/O PAP W/O HPV  Expanded    Screening for STDs (sexually transmitted diseases)  -     GP Vaginitis/Vaginosis W/O PAP W/O HPV  Expanded  -     GP Cervicitis W/O PAP W/O HPV PLUS    Cervicitis  -     GP Cervicitis W/O PAP W/O HPV PLUS    Screening for chlamydial disease  -     GP Cervicitis W/O PAP W/O HPV PLUS          Subjective:      Patient ID: Deana Brown is a 34 y.o. female. Pt presents for a problem today  She complains of vaginal irritation and odor off and on x several months  Treated for "yeast" in 1/23, but sxs came back  Periods reg  Bowel and bladder reg  New partner this year  Otherwise, no new meds, products etc    Cultures done  Daily probiotic  Witch hazel prn      The following portions of the patient's history were reviewed and updated as appropriate: allergies, current medications, past family history, past medical history, past social history, past surgical history and problem list.    Review of Systems   Constitutional: Negative for chills, fever and unexpected weight change. Gastrointestinal: Negative for abdominal pain, blood in stool, constipation and diarrhea. Genitourinary: Positive for vaginal pain (irritation and odo). Negative for vaginal bleeding and vaginal discharge. Objective:      /80   Ht 5' (1.524 m)   Wt 54.4 kg (120 lb)   LMP 07/26/2023 (Exact Date)   BMI 23.44 kg/m²          Physical Exam  Vitals and nursing note reviewed. Constitutional:       Appearance: She is well-developed. Genitourinary:     Exam position: Supine.       Labia:         Right: No rash or lesion. Left: No rash or lesion. Vagina: Vaginal discharge present. Cervix: No cervical motion tenderness, discharge or friability. Lymphadenopathy:      Lower Body: No right inguinal adenopathy. No left inguinal adenopathy.

## 2023-08-15 DIAGNOSIS — B96.89 BV (BACTERIAL VAGINOSIS): Primary | ICD-10-CM

## 2023-08-15 DIAGNOSIS — N76.0 BV (BACTERIAL VAGINOSIS): Primary | ICD-10-CM

## 2023-08-15 RX ORDER — METRONIDAZOLE 7.5 MG/G
1 GEL VAGINAL
Qty: 5 G | Refills: 0 | Status: SHIPPED | OUTPATIENT
Start: 2023-08-15 | End: 2023-08-20

## 2023-11-13 ENCOUNTER — HOSPITAL ENCOUNTER (OUTPATIENT)
Dept: RADIOLOGY | Facility: HOSPITAL | Age: 38
Discharge: HOME | End: 2023-11-13
Payer: COMMERCIAL

## 2023-11-13 DIAGNOSIS — F17.200 TOBACCO USE DISORDER: ICD-10-CM

## 2023-11-13 DIAGNOSIS — J45.30 MILD PERSISTENT ASTHMA WITHOUT COMPLICATION: ICD-10-CM

## 2023-11-13 PROCEDURE — 71046 X-RAY EXAM CHEST 2 VIEWS: CPT

## 2024-01-09 ENCOUNTER — HOSPITAL ENCOUNTER (OUTPATIENT)
Dept: MAMMOGRAPHY | Facility: HOSPITAL | Age: 39
Discharge: HOME | End: 2024-01-09
Attending: OBSTETRICS & GYNECOLOGY
Payer: COMMERCIAL

## 2024-01-09 VITALS — BODY MASS INDEX: 23.55 KG/M2 | HEIGHT: 60 IN | WEIGHT: 119.93 LBS

## 2024-01-09 DIAGNOSIS — Z12.31 SCREENING MAMMOGRAM FOR BREAST CANCER: ICD-10-CM

## 2024-01-09 PROCEDURE — 77067 SCR MAMMO BI INCL CAD: CPT

## 2024-01-09 PROCEDURE — 77063 BREAST TOMOSYNTHESIS BI: CPT

## 2024-02-15 ENCOUNTER — HOSPITAL ENCOUNTER (OUTPATIENT)
Dept: MAMMOGRAPHY | Facility: CLINIC | Age: 39
Discharge: HOME | End: 2024-02-15
Payer: COMMERCIAL

## 2024-02-15 VITALS — HEIGHT: 60 IN | BODY MASS INDEX: 23.36 KG/M2 | WEIGHT: 119 LBS

## 2024-02-15 DIAGNOSIS — R92.8 ABNORMAL MAMMOGRAM: ICD-10-CM

## 2024-02-15 PROCEDURE — 77065 DX MAMMO INCL CAD UNI: CPT

## 2024-04-04 ENCOUNTER — ANNUAL EXAM (OUTPATIENT)
Dept: GYNECOLOGY | Facility: CLINIC | Age: 39
End: 2024-04-04
Payer: COMMERCIAL

## 2024-04-04 VITALS
WEIGHT: 120 LBS | DIASTOLIC BLOOD PRESSURE: 66 MMHG | HEIGHT: 60 IN | BODY MASS INDEX: 23.56 KG/M2 | SYSTOLIC BLOOD PRESSURE: 108 MMHG

## 2024-04-04 DIAGNOSIS — Z01.419 ROUTINE GYNECOLOGICAL EXAMINATION: Primary | ICD-10-CM

## 2024-04-04 DIAGNOSIS — N94.3 PMS (PREMENSTRUAL SYNDROME): ICD-10-CM

## 2024-04-04 PROCEDURE — 99396 PREV VISIT EST AGE 40-64: CPT | Performed by: OBSTETRICS & GYNECOLOGY

## 2024-04-04 RX ORDER — FLUTICASONE FUROATE 200 UG/1
POWDER RESPIRATORY (INHALATION)
COMMUNITY
Start: 2024-01-16

## 2024-04-04 NOTE — PROGRESS NOTES
Assessment/      Normal breast and GYN exam  Normal Pap smear negative HPV 2022  PMS  mammogram 2024 recommended diagnostic right mammogram.  This was performed and appeared benign.  Recommendation was repeat diagnostic right mammogram in 6 months  Tobacco abuse 1/2 pack/day      Plan: Check diagnostic right mammogram in 6 months.  Information on PMS given.  Also recommendations of vitamins fish oil and calcium during the PMS phase were written down.  Continue healthy diet and exercise.  Encouraged to quit smoking.      Subjective:       Patient ID: Valeri Lara is a 38 y.o. female presents to the office for annual exam complaining of PMS symptoms 5 to 7 days prior to her menstrual cycles.  Her moodiness were completely resolved with the onset of her regular menses.  Denies any pelvic pain or dyspareunia.  Denies any breast bowel or bladder problems.  No change in family history.  Medications reviewed.  Recently diagnosed with asthma and has had upper airway syndrome.  Still smoking half pack a day.  No change in family history.  Father was recently diagnosed with squamous oral carcinoma.      Review of Systems   Constitutional: Negative.  Negative for fatigue, fever and unexpected weight change.   HENT: Negative.     Eyes: Negative.    Respiratory: Negative.  Negative for chest tightness, shortness of breath, wheezing and stridor.    Cardiovascular: Negative.  Negative for chest pain, palpitations and leg swelling.   Gastrointestinal: Negative.  Negative for abdominal pain, blood in stool, diarrhea, nausea, rectal pain and vomiting.   Endocrine: Negative.    Genitourinary:  Negative for dysuria, frequency, vaginal bleeding, vaginal discharge and vaginal pain.        PMS   Musculoskeletal: Negative.    Skin: Negative.    Allergic/Immunologic: Negative.    Neurological: Negative.    Hematological: Negative.    Psychiatric/Behavioral: Negative.     All other systems reviewed and are  negative.        Objective:      /66   Ht 5' (1.524 m)   Wt 54.4 kg (120 lb)   LMP 03/25/2024 (Exact Date)   BMI 23.44 kg/m²          Physical Exam  Constitutional:       Appearance: She is well-developed.   HENT:      Head: Normocephalic and atraumatic.   Neck:      Thyroid: No thyromegaly.      Trachea: No tracheal deviation.   Cardiovascular:      Rate and Rhythm: Normal rate and regular rhythm.      Heart sounds: Normal heart sounds.   Pulmonary:      Effort: Pulmonary effort is normal. No respiratory distress.      Breath sounds: Normal breath sounds. No stridor. No wheezing or rales.   Chest:      Chest wall: No tenderness.   Breasts:     Breasts are symmetrical.      Right: No inverted nipple, mass, nipple discharge, skin change or tenderness.      Left: No inverted nipple, mass, nipple discharge, skin change or tenderness.   Abdominal:      General: Bowel sounds are normal. There is no distension.      Palpations: Abdomen is soft. There is no mass.      Tenderness: There is no abdominal tenderness. There is no guarding or rebound.      Hernia: No hernia is present. There is no hernia in the left inguinal area.   Genitourinary:     Labia:         Right: No rash, tenderness, lesion or injury.         Left: No rash, tenderness, lesion or injury.       Vagina: Normal. No signs of injury and foreign body. No vaginal discharge, erythema, tenderness or bleeding.      Cervix: No cervical motion tenderness, discharge or friability.      Uterus: Not deviated, not enlarged, not fixed and not tender.       Adnexa:         Right: No mass, tenderness or fullness.          Left: No mass, tenderness or fullness.        Rectum: No mass, anal fissure, external hemorrhoid or internal hemorrhoid.   Musculoskeletal:      Cervical back: Normal range of motion and neck supple.   Lymphadenopathy:      Lower Body: No right inguinal adenopathy. No left inguinal adenopathy.   Skin:     General: Skin is warm and dry.    Neurological:      Mental Status: She is alert and oriented to person, place, and time.   Psychiatric:         Behavior: Behavior normal.         Thought Content: Thought content normal.         Judgment: Judgment normal.

## 2024-04-11 ENCOUNTER — TELEPHONE (OUTPATIENT)
Age: 39
End: 2024-04-11

## 2024-04-11 NOTE — TELEPHONE ENCOUNTER
Pt called about the status of her pap smear results. Reviewed they are still in process, she will be notified once resulted and reviewed by provider.

## 2024-04-17 LAB
HPV HR 12 DNA CVX QL NAA+PROBE: NEGATIVE
HPV16 DNA CVX QL NAA+PROBE: NEGATIVE
HPV18 DNA CVX QL NAA+PROBE: NEGATIVE
LAB AP GYN PRIMARY INTERPRETATION: ABNORMAL
Lab: ABNORMAL
PATH INTERP SPEC-IMP: ABNORMAL

## 2024-05-28 DIAGNOSIS — B37.9 YEAST INFECTION: Primary | ICD-10-CM

## 2024-05-28 NOTE — TELEPHONE ENCOUNTER
Patient called the RX Refill Line. Message is being forwarded to the office.     Patient is requesting a script for a yeast infection.  She's been using the gel but its not helping and she wants the pill.    [facility] [place]      Please contact patient at  [phone] with any questions.

## 2024-05-30 RX ORDER — FLUCONAZOLE 150 MG/1
150 TABLET ORAL ONCE
Qty: 1 TABLET | Refills: 0 | Status: SHIPPED | OUTPATIENT
Start: 2024-05-30 | End: 2024-05-30

## 2024-05-30 NOTE — TELEPHONE ENCOUNTER
Patient called in regarding a prescription for yeast infection being sent to the pharmacy for her. She would prefer pill over cream.

## 2024-07-23 ENCOUNTER — TELEPHONE (OUTPATIENT)
Age: 39
End: 2024-07-23

## 2024-08-08 ENCOUNTER — HOSPITAL ENCOUNTER (OUTPATIENT)
Dept: MAMMOGRAPHY | Facility: CLINIC | Age: 39
Discharge: HOME | End: 2024-08-08
Payer: COMMERCIAL

## 2024-08-08 VITALS — BODY MASS INDEX: 22.97 KG/M2 | HEIGHT: 60 IN | WEIGHT: 117 LBS

## 2024-08-08 DIAGNOSIS — R92.8 ABNORMAL MAMMOGRAM: ICD-10-CM

## 2024-08-08 PROCEDURE — 77065 DX MAMMO INCL CAD UNI: CPT

## 2024-09-30 ENCOUNTER — TELEPHONE (OUTPATIENT)
Age: 39
End: 2024-09-30

## 2024-09-30 NOTE — TELEPHONE ENCOUNTER
Patient calling needing to schedule with Urology for kidney cysts. Attempted to transfer and call dropped. Called patient back and call dropped again.

## 2024-10-07 ENCOUNTER — TELEPHONE (OUTPATIENT)
Age: 39
End: 2024-10-07

## 2024-10-07 NOTE — TELEPHONE ENCOUNTER
NP calling to r/s appt today with Dr Garcia.    Pt is being seen for renal cysts and had MRI completed on 5/6/24 which showed:    KIDNEYS: Symmetric enhancement. No hydronephrosis or suspicious mass. Bilateral renal cysts measuring up to 4.8 cm in the upper pole the right kidney which demonstrates thin septation (6:14).     Pt r/s for next available on 11/14 at 215 with Dr Garcia.

## 2024-11-15 ENCOUNTER — OFFICE VISIT (OUTPATIENT)
Age: 39
End: 2024-11-15
Payer: COMMERCIAL

## 2024-11-15 VITALS
WEIGHT: 117 LBS | OXYGEN SATURATION: 97 % | BODY MASS INDEX: 22.85 KG/M2 | RESPIRATION RATE: 85 BRPM | SYSTOLIC BLOOD PRESSURE: 90 MMHG | DIASTOLIC BLOOD PRESSURE: 70 MMHG

## 2024-11-15 DIAGNOSIS — N28.1 RENAL CYST, ACQUIRED, RIGHT: Primary | ICD-10-CM

## 2024-11-15 PROCEDURE — 99203 OFFICE O/P NEW LOW 30 MIN: CPT | Performed by: UROLOGY

## 2024-11-15 NOTE — PROGRESS NOTES
Assessment/Plan:    Renal cyst, acquired, right  Based on report 1 of renal cyst looks slightly complicated likely Bosniak 2 cyst although not well classified.  I do not have the images to review myself.  We discussed that the majority of renal cysts are benign and will never become malignant but there are certain cysts which need to be followed.  At this point is unclear what follow-up she needs and therefore I recommend follow-up imaging.      We will order a CT scan to better assess to be done in the spring of next year.  I also asked her to bring her outside imaging studies for comparison.  If she is get an MRI through her outside doctors who are in [place] then she will simply cancel her CT scan and bring in the imaging done at the outside hospital for us to review.          Subjective:      Patient ID: Valeri Lara is a 39 y.o. female.    HPI  Mr. Lara is a very pleasant 39-year-old female referred for imaging finding of renal cysts found in workup for liver hemangiomas.    The patient was found to have liver hemangiomas on prior imaging which has resulted in multiple MRIs to follow this.  We do not have access to any of these imaging studies but do a report from her most recent  MRI of the abdomen in May 2024 at outside institution which showed bilateral renal cyst including a 5 cm right upper pole renal cyst with a thin septation.  Per the report the cysts contents did not appear concerning.      The patient does not have voiding issues or flank pain or hematuria.    Past Surgical History:   Procedure Laterality Date    COLONOSCOPY      COLPOSCOPY      FERTILITY SURGERY      GYNECOLOGIC CRYOSURGERY      INDUCED       MYRINGOTOMY      VAGINAL DELIVERY          Past Medical History:   Diagnosis Date    Asthma     Disease of thyroid gland     Esophageal hernia      2 para 2     HPV in female     Papanicolaou smear 10/24/2018    NEG    Varicella     needs vaccine             Review  "of Systems   Constitutional:  Negative for chills and fever.   HENT:  Negative for ear pain and sore throat.    Eyes:  Negative for pain and visual disturbance.   Respiratory:  Negative for cough and shortness of breath.    Cardiovascular:  Negative for chest pain and palpitations.   Gastrointestinal:  Negative for abdominal pain and vomiting.   Genitourinary:  Negative for dysuria and hematuria.   Musculoskeletal:  Negative for arthralgias and back pain.   Skin:  Negative for color change and rash.   Neurological:  Negative for seizures and syncope.   All other systems reviewed and are negative.        Objective:      BP 90/70 (BP Location: Left arm, Patient Position: Sitting, Cuff Size: Standard)   Resp (!) 85   Wt 53.1 kg (117 lb)   SpO2 97%   BMI 22.85 kg/m²     No results found for: \"PSA\"       Physical Exam  Vitals reviewed.   Constitutional:       General: She is not in acute distress.     Appearance: Normal appearance. She is not ill-appearing, toxic-appearing or diaphoretic.   HENT:      Head: Normocephalic and atraumatic.   Eyes:      Extraocular Movements: Extraocular movements intact.      Pupils: Pupils are equal, round, and reactive to light.   Pulmonary:      Effort: Pulmonary effort is normal.   Abdominal:      General: Abdomen is flat. There is no distension.      Palpations: Abdomen is soft. There is no mass.      Tenderness: There is no abdominal tenderness. There is no guarding or rebound.      Hernia: No hernia is present.   Skin:     General: Skin is warm.   Neurological:      General: No focal deficit present.      Mental Status: She is alert and oriented to person, place, and time. Mental status is at baseline.   Psychiatric:         Mood and Affect: Mood normal.         Behavior: Behavior normal.         Thought Content: Thought content normal.           Impression    Scattered hepatic hemangiomas measuring up to 4.5 cm. Additional indeterminate enhancing lesions in the bilateral hepatic " lobes which which may reflect focal nodular hyperplasia, hepatic adenomas or possibly atypical hepatic hemangiomas. Repeat contrast-enhanced MRI of the abdomen utilizing Eovist IV contrast should be considered for further characterization.  Narrative    Examination: MR ABDOMEN W WO CONTRAST    Reason for Procedure: Further evaluate liver lesions.    Technique: Multisequence multiplanar MR imaging of the abdomen was performed both prior to and following intravenous contrast administration.    GADOBUTROL 7.5 MMOL/7.5 ML (1 MMOL/ML) INTRAVENOUS SOLUTION (5 mL administered). No reported contrast discarded.    Comparison:  None available.    Findings:    LUNG BASES: Clear.    LIVER: Normal morphology and signal intensity. Multiple T2 hyperintense lesions are scattered throughout the liver measuring up to 4.5 cm in hepatic segment II/III, most of which demonstrate early peripheral nodular enhancement with subsequent centripetal enhancement, compatible with hemangiomas (7:7). A few smaller T2 hyperintense lesions demonstrate early homogeneous arterial hyperenhancement which persists on subsequent contrast-enhanced phases, compatible with flash filling hemangiomas (6:24). Additional scattered foci of arterial hyperenhancement are noted along the periphery of the right hepatic lobe which are not seen on the delayed contrast-enhanced phases and are without correlate on T2 or diffusion-weighted imaging, likely small vascular shunts and nonspecific (14:19, 34).    A 0.8 cm T2 slightly hyperintense/isointense lesion is seen in hepatic segment VI which demonstrates diffusion restriction as well as early arterial hyperenhancement with somewhat progressive isointensity to the background liver on subsequent contrast-enhanced phases (5:18, 11:18 and 15:45). An additional 2.9 x 2.7 cm lesion is seen in hepatic segment III which demonstrates similar signal characteristics (5:17 and 11:18).    BILIARY: Sludge within a contracted  gallbladder. Otherwise, unremarkable gallbladder. No biliary ductal dilatation.    SPLEEN: Normal.    PANCREAS: Normal.    ADRENALS: Normal.    KIDNEYS: Symmetric enhancement. No hydronephrosis or suspicious mass. Bilateral renal cysts measuring up to 4.8 cm in the upper pole the right kidney which demonstrates thin septation (6:14).    BOWEL: No bowel related abnormality.    PERITONEUM:  No ascites.      LYMPH NODES: No enlarged lymph nodes.    VASCULATURE: Normal caliber abdominal aorta.    BONES & SOFT TISSUES: No aggressive lesions.    Orders  Orders Placed This Encounter   Procedures    CT abdomen pelvis w wo contrast     please provide bosniak score of renal cysts     Standing Status:   Future     Expected Date:   3/17/2025     Expiration Date:   11/15/2028     Scheduling Instructions:      For procedures with both oral (PO) and IV contrast:            The patient will need to drink barium for this test. Barium needs to be picked up in the registration area at least one day prior to your study.For out of network (non-[facility]) orders please bring your prescription when picking up oral contrast. For AM Appointments: Drink one bottle of barium before bed time the evening before your scheduled test.  Drink 1/2 of the second bottle one hour prior to your test. Please bring other 1/2 bottle with you to drink at the time of your study. For PM Appointments: Drink one bottle of barium before 9:00am on the day of your test. Drink 1/2 of the second bottle one hour prior to your test. Please bring other 1/2 bottle with you to drink at the time of your study. Nothing to eat 3 hours prior to your test. In addition to the barium, clear liquids are also permitted up until the time of the scan. Clear liquids includes water, black coffee or tea, apple juice or clear broth. If possible wear clothing without any metal in the abdomen area. Sweat suit,       sports bra or bra without underwire may eliminate the need to change.  Please bring your insurance cards, a form of photo ID and a list of your medications with you. Arrive 15 minutes prior to your appointment time in order to register. On the day of your test, please bring any prior CT or MRI studies of this area with you that were not performed at a [facility] facility.            For procedures with ONLY IV contrast:            Nothing to eat 3 hours prior to your test. Clear liquids are permitted up until the time of the scan. Clear liquids includes water, black coffee or tea, apple juice or clear broth. If possible wear clothing without any metal in the abdomen area. Sweat suit, sports bra or bra without underwire may eliminate the need to change. Please bring your insurance cards, a form of photo ID and a list of your medications with you. Arrive 15 minutes prior to your appointment time in order to register. On the day of your test, please bring any prior CT or MRI studies of this area with you that were not performed at a [facility].                        To schedule this appointment, please contact Central Scheduling at [phone].     Reason for exam (if ordering for AAA, please change to CTA Chest Abdomen Pelvis [[id number]]; if ordering for Renal Protocol, please change to CT Renal Protocol [[id number]])::   pt with liver and renal cysts including right renal cyst with thin sepetation. want to reasess. please provide bosniak score of renal cysts     Is the patient pregnant?:   No     With IV Contrast already included in this study, what is the oral contrast requirement?:   No PO     Do any of the following pertain to the patient? If yes, patient must have BUN/CREATINE/EGFR results within 90 days of exam.:   No     Has the patient ever have a reaction to CT contrast? If yes, please verify the type of reaction and order the contrast allergy prep.:   No     What is the patient's sedation requirement?:   No Sedation     Did the patient ever have bariatric surgery?:    No     Release to patient through [facility]:   Immediate    Basic metabolic panel     This is a patient instruction: Patient fasting for 8 hours or longer recommended.     Standing Status:   Future     Expected Date:   2/15/2025     Expiration Date:   11/15/2025

## 2024-11-15 NOTE — ASSESSMENT & PLAN NOTE
Based on report 1 of renal cyst looks slightly complicated likely Bosniak 2 cyst although not well classified.  I do not have the images to review myself.  We discussed that the majority of renal cysts are benign and will never become malignant but there are certain cysts which need to be followed.  At this point is unclear what follow-up she needs and therefore I recommend follow-up imaging.      We will order a CT scan to better assess to be done in the spring of next year.  I also asked her to bring her outside imaging studies for comparison.  If she is get an MRI through her outside doctors who are in [place] then she will simply cancel her CT scan and bring in the imaging done at the outside hospital for us to review.

## 2025-01-16 ENCOUNTER — HOSPITAL ENCOUNTER (OUTPATIENT)
Dept: ULTRASOUND IMAGING | Facility: CLINIC | Age: 40
Discharge: HOME | End: 2025-01-16
Payer: COMMERCIAL

## 2025-01-16 ENCOUNTER — HOSPITAL ENCOUNTER (OUTPATIENT)
Dept: MAMMOGRAPHY | Facility: CLINIC | Age: 40
Discharge: HOME | End: 2025-01-16
Payer: COMMERCIAL

## 2025-01-16 VITALS — HEIGHT: 60 IN | WEIGHT: 117 LBS | BODY MASS INDEX: 22.97 KG/M2

## 2025-01-16 DIAGNOSIS — R92.8 ABNORMAL FINDINGS ON DIAGNOSTIC IMAGING OF BREAST: ICD-10-CM

## 2025-01-16 PROCEDURE — G0279 TOMOSYNTHESIS, MAMMO: HCPCS

## 2025-01-16 PROCEDURE — 77066 DX MAMMO INCL CAD BI: CPT

## 2025-01-16 PROCEDURE — 76642 ULTRASOUND BREAST LIMITED: CPT

## 2025-01-16 NOTE — PROGRESS NOTES
Met with patient and Dr. Crandall  regarding recommendation for;    __x___ RIGHT ____x__LEFT      ___x__Ultrasound guided  ______Stereotactic breast biopsy.      __X___Verbalized understanding.    Reviewed clip placement with patient, pt states understanding: Yes: _x___ No: ____  Comments:    Blood thinners:  No: ___x__ Yes: ______ What:          Biopsy teaching sheet given:  Yes: ___X___ No: ________    Pt given contact information and adv to call with any questions/needs    Patient advised to arrive at 8:00... for a  8:30... appointment

## 2025-01-29 ENCOUNTER — HOSPITAL ENCOUNTER (OUTPATIENT)
Dept: ULTRASOUND IMAGING | Facility: CLINIC | Age: 40
Discharge: HOME | End: 2025-01-29
Payer: COMMERCIAL

## 2025-01-29 ENCOUNTER — HOSPITAL ENCOUNTER (OUTPATIENT)
Dept: MAMMOGRAPHY | Facility: CLINIC | Age: 40
Discharge: HOME | End: 2025-01-29
Payer: COMMERCIAL

## 2025-01-29 VITALS — HEART RATE: 78 BPM | SYSTOLIC BLOOD PRESSURE: 101 MMHG | DIASTOLIC BLOOD PRESSURE: 69 MMHG

## 2025-01-29 DIAGNOSIS — R92.8 ABNORMAL MAMMOGRAM: ICD-10-CM

## 2025-01-29 PROCEDURE — 88341 IMHCHEM/IMCYTCHM EA ADD ANTB: CPT | Performed by: SPECIALIST

## 2025-01-29 PROCEDURE — 88342 IMHCHEM/IMCYTCHM 1ST ANTB: CPT | Performed by: SPECIALIST

## 2025-01-29 PROCEDURE — 19083 BX BREAST 1ST LESION US IMAG: CPT

## 2025-01-29 PROCEDURE — A4648 IMPLANTABLE TISSUE MARKER: HCPCS

## 2025-01-29 PROCEDURE — 88305 TISSUE EXAM BY PATHOLOGIST: CPT | Performed by: SPECIALIST

## 2025-01-29 PROCEDURE — 19084 BX BREAST ADD LESION US IMAG: CPT

## 2025-01-29 RX ORDER — LIDOCAINE HYDROCHLORIDE 10 MG/ML
5 INJECTION, SOLUTION EPIDURAL; INFILTRATION; INTRACAUDAL; PERINEURAL ONCE
Status: COMPLETED | OUTPATIENT
Start: 2025-01-29 | End: 2025-01-29

## 2025-01-29 RX ADMIN — LIDOCAINE HYDROCHLORIDE 5 ML: 10 INJECTION, SOLUTION EPIDURAL; INFILTRATION; INTRACAUDAL; PERINEURAL at 09:04

## 2025-01-29 RX ADMIN — LIDOCAINE HYDROCHLORIDE 5 ML: 10 INJECTION, SOLUTION EPIDURAL; INFILTRATION; INTRACAUDAL; PERINEURAL at 09:13

## 2025-01-29 NOTE — PROGRESS NOTES
Procedure type: Site # 1    ___x__ultrasound guided _____stereotactic    Breast:    ___x__Left _____Right    Location: 2 o'clock 8cmfn    Needle: 12G    # of passes: 3    Clip: Mini Santa      Procedure type: Site # 2    ___x__ultrasound guided _____stereotactic    Breast:    _____Left _x____Right    Location: 2 o'clock 3cmfn    Needle: 12G    # of passes: 5    Clip: Santa      Performed by: Dr. Crandall    Pressure held for 5 minutes by: Loren Drake Strips:    ___X__yes _____no    Band aid:    __X___yes_____no    Tolerated procedure:    __X___yes _____no

## 2025-01-31 ENCOUNTER — TELEPHONE (OUTPATIENT)
Dept: MAMMOGRAPHY | Facility: CLINIC | Age: 40
End: 2025-01-31

## 2025-01-31 PROCEDURE — 88341 IMHCHEM/IMCYTCHM EA ADD ANTB: CPT | Performed by: SPECIALIST

## 2025-01-31 PROCEDURE — 88305 TISSUE EXAM BY PATHOLOGIST: CPT | Performed by: SPECIALIST

## 2025-01-31 PROCEDURE — 88342 IMHCHEM/IMCYTCHM 1ST ANTB: CPT | Performed by: SPECIALIST

## 2025-02-04 ENCOUNTER — TELEPHONE (OUTPATIENT)
Dept: MAMMOGRAPHY | Facility: CLINIC | Age: 40
End: 2025-02-04

## 2025-02-04 ENCOUNTER — TELEPHONE (OUTPATIENT)
Age: 40
End: 2025-02-04

## 2025-02-04 ENCOUNTER — TELEPHONE (OUTPATIENT)
Dept: HEMATOLOGY ONCOLOGY | Facility: CLINIC | Age: 40
End: 2025-02-04

## 2025-02-04 DIAGNOSIS — N64.89 COMPLEX SCLEROSING LESION OF RIGHT BREAST: Primary | ICD-10-CM

## 2025-02-04 NOTE — TELEPHONE ENCOUNTER
Referral to Surgical Oncology received.  Chart reviewed by  for Surgical oncology at this time.       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

## 2025-02-04 NOTE — TELEPHONE ENCOUNTER
Patient  needed to be scheduled with first available. Patient wants to be seen at [facility], but first available is at [facility] 3/7/25 @ 8:00am. Patient would like a callback asap for sooner appt at [place].  I placed patient on waitlist.

## 2025-02-05 ENCOUNTER — TELEPHONE (OUTPATIENT)
Dept: SURGICAL ONCOLOGY | Facility: CLINIC | Age: 40
End: 2025-02-05

## 2025-02-05 NOTE — TELEPHONE ENCOUNTER
Rescheduled appointment with patient for a sooner appointment as requested but with Dr. Cuevas instead of Dr. Cardarelli for 2/28 at the [facility].

## 2025-02-11 ENCOUNTER — TELEPHONE (OUTPATIENT)
Dept: OBGYN CLINIC | Facility: CLINIC | Age: 40
End: 2025-02-11

## 2025-02-11 NOTE — TELEPHONE ENCOUNTER
Counseling Documentation:    Introduced self to patient and reviewed patient's concerns for prior pap results  Previously told to return for repeat testing in 6-12 months  We discussed prior results in 1/2022 NILM, HPV neg followed by most recent results in 4/2024 ASCUS, HPV neg  Per ASCCP guidelines, recommend repeat cervical cancer screening in 3 years with overall ADAN 3 5-year risk 0.27-0.40%  Additionally discussed patient's concern for bilateral breast masses with ongoing plans for surgical excision  Reviewed prior mammography and ultrasound imaging, BI-RADS 4 with right side 4C  US-FNA negative for in-situ or invasive carcinoma bilaterally although noted intraductal papilloma, complex sclerosing lesion/radial scar, apocrine metaplasia and calcifications noted in right breast  Recommended continued consultation with breast surgeon and [facility]  All questions answered  Recommend annual visit in 3-6 months or when able pending ongoing workup as above      Gonzalo Aleman MD  OB/GYN PGY-4

## 2025-02-25 PROBLEM — D36.9 INTRADUCTAL PAPILLOMA: Status: ACTIVE | Noted: 2025-02-25

## 2025-02-25 PROBLEM — N64.89 RADIAL SCAR OF RIGHT BREAST: Status: ACTIVE | Noted: 2025-02-25

## 2025-02-28 ENCOUNTER — OFFICE VISIT (OUTPATIENT)
Dept: SURGICAL ONCOLOGY | Facility: CLINIC | Age: 40
End: 2025-02-28
Payer: COMMERCIAL

## 2025-02-28 VITALS
DIASTOLIC BLOOD PRESSURE: 70 MMHG | SYSTOLIC BLOOD PRESSURE: 92 MMHG | WEIGHT: 120 LBS | HEIGHT: 60 IN | OXYGEN SATURATION: 95 % | HEART RATE: 87 BPM | BODY MASS INDEX: 23.56 KG/M2

## 2025-02-28 DIAGNOSIS — N64.89 RADIAL SCAR OF RIGHT BREAST: Primary | ICD-10-CM

## 2025-02-28 DIAGNOSIS — N64.89 COMPLEX SCLEROSING LESION OF RIGHT BREAST: ICD-10-CM

## 2025-02-28 DIAGNOSIS — D36.9 INTRADUCTAL PAPILLOMA: ICD-10-CM

## 2025-02-28 PROCEDURE — 99205 OFFICE O/P NEW HI 60 MIN: CPT | Performed by: SURGERY

## 2025-02-28 RX ORDER — ACETAMINOPHEN AND CODEINE PHOSPHATE 300; 30 MG/1; MG/1
1 TABLET ORAL EVERY 6 HOURS PRN
Qty: 20 TABLET | Refills: 0 | Status: CANCELLED | OUTPATIENT
Start: 2025-02-28

## 2025-02-28 NOTE — PROGRESS NOTES
Name: Valeri Lara      : 1985      MRN: [id number]  Encounter Provider: Cora Cuevas MD  Encounter Date: 2025   Encounter department: [facility]  :  Assessment & Plan  Radial scar of right breast         Intraductal papilloma         Complex sclerosing lesion of right breast    Orders:  •  Ambulatory referral to Surgical Oncology        {Oncology Survivorship (Optional):31202}    History of Present Illness {?Quick Links Encounters * My Last Note * Last Note in Specialty * Snapshot * Since Last Visit * History :80249}  Valeri Lara is a 39 y.o. year old female who presents ***.       Review of Systems A complete review of systems is negative other than that noted above in the HPI.    {Select to insert medical history sections (Optional):77353}       Objective {?Quick Links Trend Vitals * Enter New Vitals * Results Review * Timeline (Adult) * Labs * Imaging * Cardiology * Procedures * Lung Cancer Screening * Surgical eConsent :83886}  BP 92/70   Pulse 87   Ht 5' (1.524 m)   Wt 54.4 kg (120 lb)   SpO2 95%   BMI 23.44 kg/m²     Pain Screening:     ECOG    Physical Exam ***    Labs: I have reviewed pertinent labs. {SL AMB ONCOLOGY LABS (Optional):96362}  Hospital Outpatient Visit on 2025   Component Date Value Ref Range Status   • Case Report 2025    Final                    Value:Surgical Pathology Report                         Case: [id number]                                  Authorizing Provider:  Gonzalo Lee MD            Collected:           2025 0908              Ordering Location:     [facility] Received:            2025 1657                                     Center                                                                       Pathologist:           Negro Rice MD                                                     Specimens:   A) - Breast, Left, us bx lt breast 200  "8cmfn 3 passes 12g                                           B) - Breast, Right, us bx rt breast 200 3cmfn 5 passes 12g                                • Final Diagnosis 01/29/2025    Final                    Value:A. Breast, left, \"us bx lt breast 200 8cmfn 3 passes 12g\":    - Clusters of apocrine cyst/ apocrine metaplasia with calcifications. See comment.     - Negative for atypical hyperplasia, in situ and invasive carcinoma.     B. Breast, right, \"us bx rt breast 200 3cmfn 5 passes 12g\":    - Proliferative fibrocystic changes including, usual ductal hyperplasia, complex sclerosing lesion/ radial scar, intraductal papilloma (at least 2 mm), apocrine metaplasia and calcifications. See comment.     - Negative for atypical hyperplasia, in situ and invasive carcinoma.      • Note 01/29/2025    Final                    Value:Comment A and B: Immunohistochemical stains performed with adequate controls on block A1 and B1  demonstrate Calponin and p63 highlight myoepithelial cells. CK5/6 shows mosaic staining pattern. ER is weak and patchy positive. The immunophenotype is compatible with the diagnosis.      • Additional Information 01/29/2025    Final                    Value:All reported additional testing was performed with appropriately reactive controls.  These tests were developed and their performance characteristics determined by [facility] or appropriate performing facility, though some tests may be performed on tissues which have not been validated for performance characteristics (such as staining performed on alcohol exposed cell blocks and decalcified tissues).  Results should be interpreted with caution and in the context of the patients’ clinical condition. These tests may not be cleared or approved by the U.S. Food and Drug Administration, though the FDA has determined that such clearance or approval is not necessary. These tests are used for clinical purposes and they should not be " "regarded as investigational or for research. This laboratory has been approved by CLIA 88, designated as a high-complexity laboratory and is qualified to perform these tests.     • Gross Description 01/29/2025    Final                    Value:A. The specimen is received in formalin, labeled with the patient's name and hospital number, and is designated \" US biopsy left breast 200 8cmfn 3 passes 12G\".  The specimen consists of 3 tan-yellow cores of fibrofatty and friable soft tissue measuring 0.1-0.5 cm in diameter and ranging from 1.5-1.7 cm in length.  Entirely submitted. Two cassettes.  Between sponges.  B. The specimen is received in formalin, labeled with the patient's name and hospital number, and is designated \" US biopsy right breast 200 3cmfn 5 passes 12G\".  The specimen consists of 5 tan-yellow hemorrhagic cores of fibrofatty and friable soft tissue measuring less than 0.1-0.3 cm in diameter and ranging from 1.5-2.5 cm in length.  Entirely submitted. Three cassettes.  Between sponges.    The cold ischemia time based upon information provided by the submitting clinician and receiving staff in the laboratory is within 1 minute.      Note: The estimated total formalin fixation time based upon information provided by                           the submitting clinician and the standard processing schedule is 14.75 hours.    Khushboo     • Clinical Information 01/29/2025    Final                    Value:Hypoechoic mass     Pathology: I have reviewed pathology reports described above.    {Radiology Results Review (Optional):01708}  CDIAGNOSIS: Abnormal findings on diagnostic imaging of breast      TECHNIQUE:   Digital diagnostic mammography was performed. Computer Aided Detection (CAD) analyzed all applicable images.  Bilateral breast ultrasound was performed.      COMPARISONS: Prior breast imaging dated: 08/08/2024, 02/15/2024, and 01/09/2024     RELEVANT HISTORY:   Family Breast Cancer History: History of " breast cancer in Neg Hx.  Family Medical History: No known relevant family medical history.   Personal History: Hormone history includes birth control and hormone replacement therapy. No known relevant surgical history. No known relevant medical history.     RISK ASSESSMENT:   5 Year Tyrer-Cuzick: 0.91%  10 Year Tyrer-Cuzick: 2.27%  Lifetime Tyrer-Cuzick: 16.36%     TISSUE DENSITY:   The breasts are extremely dense, which lowers the sensitivity of mammography.     INDICATION: Valeri Lara is a 39 y.o. female presenting for 5 Month follow up.     FINDINGS:   LEFT  1) MASS [C]  Mammo diagnostic bilateral w 3d & cad: There is an oval mass with obscured margins seen in the left breast at 2 o'clock in the posterior depth, 8 cm from the nipple on the MLO and ML views.   US breast bilateral limited (diagnostic): There is a 9 mm x 7 mm x 3 mm oval, parallel, hypoechoic mass with angular margins seen in the left breast at 2 o'clock, 8 cm from the nipple. The mass correlates with the mass seen on the mammogram.      RIGHT  2) CALCIFICATIONS [A]  Mammo diagnostic bilateral w 3d & cad: There are large mayra-like calcifications in a grouped distribution seen in the outer central region of the right breast at 9 o'clock in the anterior depth. Compared to the previous study, there are no significant changes.   3) MASS [B]  Mammo diagnostic bilateral w 3d & cad: There is an equal density, irregularly shaped mass with obscured margins seen in the upper inner quadrant of the right breast at 2 o'clock, 3 cm from the nipple. There are also associated fine pleomorphic calcifications in a grouped distribution.   US breast bilateral limited (diagnostic): There is a 6 mm x 5 mm x 4 mm irregularly shaped, hypoechoic mass with angular margins seen in the upper inner quadrant of the right breast at 2 o'clock, 3 cm from the nipple. The mass correlates with the mass seen on the mammogram.      Axillae: Targeted ultrasound of the left and  right axilla was performed.  At least 1 morphologically benign lymph node was visualized in each axilla.  No morphologically abnormal lymph nodes were visualized.        IMPRESSION:  1.  Mass in the right breast at 2:00, 3 cm from the nipple is highly suspicious of malignancy and mass in the left breast at 2:00, 8 cm the nipple is suspicious.  Ultrasound-guided core needle biopsies are recommended.  I personally discussed the imaging findings and recommendation for biopsy with the patient.  She met with the nurse navigator to schedule the procedure.  2.  Right breast calcifications at 9:00 are stable for 12 months and probably benign.           ASSESSMENT/BI-RADS CATEGORY:  Left: 4 - Suspicious  Right: 4C - High Suspicion for Malignancy  Overall: 4 - Suspicious     RECOMMENDATION:       - Diagnostic mammogram in 1 year for the right breast.       - Ultrasound-guided breast biopsy for both breasts.oncordance: {YES/NO:20200}    {Administrative / Billing Section (Optional):05232}

## 2025-02-28 NOTE — PROGRESS NOTES
Call placed to patient regarding recommendation for;    __x___ RIGHT ______LEFT      ____Ultrasound guided  ___x___Stereotactic breast biopsy.    Pt states that procedure was explained to her, additional questions answered at this time    __X___Verbalized understanding.    Reviewed clip placement with patient, pt states understanding: Yes: __x__ No: ____  Comments:    Blood thinners: No: ___x__ Yes: _____ What:     Biopsy teaching sheet given:  _____yes __X__no (All teaching points discussed during call, pt with no questions at this time, pt adv to arrive at 1330 for 1400 biopsy)    Pt given name/# for any further questions/needs    Pt agreeable to a post procedure call and states we can give her biopsy results to her over the phone

## 2025-03-04 ENCOUNTER — TELEPHONE (OUTPATIENT)
Dept: MAMMOGRAPHY | Facility: CLINIC | Age: 40
End: 2025-03-04

## 2025-03-08 ENCOUNTER — HOSPITAL ENCOUNTER (OUTPATIENT)
Dept: MAMMOGRAPHY | Facility: CLINIC | Age: 40
Discharge: HOME | End: 2025-03-08
Payer: COMMERCIAL

## 2025-03-08 ENCOUNTER — HOSPITAL ENCOUNTER (OUTPATIENT)
Dept: MAMMOGRAPHY | Facility: CLINIC | Age: 40
Discharge: HOME | End: 2025-03-08

## 2025-03-08 VITALS — SYSTOLIC BLOOD PRESSURE: 97 MMHG | DIASTOLIC BLOOD PRESSURE: 62 MMHG | HEART RATE: 76 BPM

## 2025-03-08 DIAGNOSIS — R92.8 ABNORMAL FINDING ON BREAST IMAGING: ICD-10-CM

## 2025-03-08 PROCEDURE — 88341 IMHCHEM/IMCYTCHM EA ADD ANTB: CPT | Performed by: PATHOLOGY

## 2025-03-08 PROCEDURE — 19081 BX BREAST 1ST LESION STRTCTC: CPT

## 2025-03-08 PROCEDURE — 88305 TISSUE EXAM BY PATHOLOGIST: CPT | Performed by: PATHOLOGY

## 2025-03-08 PROCEDURE — A4648 IMPLANTABLE TISSUE MARKER: HCPCS

## 2025-03-08 PROCEDURE — 88342 IMHCHEM/IMCYTCHM 1ST ANTB: CPT | Performed by: PATHOLOGY

## 2025-03-08 RX ORDER — LIDOCAINE HYDROCHLORIDE AND EPINEPHRINE BITARTRATE 20; .01 MG/ML; MG/ML
10 INJECTION, SOLUTION SUBCUTANEOUS ONCE
Status: COMPLETED | OUTPATIENT
Start: 2025-03-08 | End: 2025-03-08

## 2025-03-08 RX ORDER — LIDOCAINE HYDROCHLORIDE 10 MG/ML
5 INJECTION, SOLUTION EPIDURAL; INFILTRATION; INTRACAUDAL; PERINEURAL ONCE
Status: COMPLETED | OUTPATIENT
Start: 2025-03-08 | End: 2025-03-08

## 2025-03-08 RX ADMIN — LIDOCAINE HYDROCHLORIDE,EPINEPHRINE BITARTRATE 10 ML: 20; .01 INJECTION, SOLUTION INFILTRATION; PERINEURAL at 13:48

## 2025-03-08 RX ADMIN — LIDOCAINE HYDROCHLORIDE 5 ML: 10 INJECTION, SOLUTION EPIDURAL; INFILTRATION; INTRACAUDAL; PERINEURAL at 13:48

## 2025-03-08 NOTE — PROGRESS NOTES
Procedure type:    _____ultrasound guided __x___stereotactic    Breast:    _____Left ___x__Right    Location: 9 o'clock    Needle: 10g     # of passes: 5 cores all together (1 core with calcs and 4 cores without calcs)    Clip: Mirian    Performed by: Dr. Weeks     Pressure held for 5 minutes by: Leopoldo Drake Strips:    ___X__yes _____no    Band aid: (pressure dressing applied with 4x4 gauze and paper tape)    __X___yes_____no    Tolerated procedure:    __X___yes _____no

## 2025-03-10 NOTE — PROGRESS NOTES
Post procedure call completed    Bleeding: _____yes __X___no    Pain: _____yes ___X___no    Redness/Swelling: ______yes ___X___no    Band aid removed: _____yes ___X__no (discussed removing when she showers)    Steri-Strips intact: ___X___yes _____no (discussed with patient to remove steri strips on Thur if they have not come off on their own)    Pt with no questions at this time, adv will call when results available, adv to call with any questions or concerns, has name/# for contact

## 2025-03-12 PROCEDURE — 88342 IMHCHEM/IMCYTCHM 1ST ANTB: CPT | Performed by: PATHOLOGY

## 2025-03-12 PROCEDURE — 88305 TISSUE EXAM BY PATHOLOGIST: CPT | Performed by: PATHOLOGY

## 2025-03-12 PROCEDURE — 88341 IMHCHEM/IMCYTCHM EA ADD ANTB: CPT | Performed by: PATHOLOGY

## 2025-03-15 LAB
BUN SERPL-MCNC: 22 MG/DL (ref 7–25)
BUN/CREAT SERPL: NORMAL (CALC) (ref 6–22)
CALCIUM SERPL-MCNC: 8.8 MG/DL (ref 8.6–10.2)
CHLORIDE SERPL-SCNC: 108 MMOL/L (ref 98–110)
CO2 SERPL-SCNC: 28 MMOL/L (ref 20–32)
CREAT SERPL-MCNC: 0.77 MG/DL (ref 0.5–0.99)
GFR/BSA.PRED SERPLBLD CYS-BASED-ARV: 99 ML/MIN/1.73M2
GLUCOSE SERPL-MCNC: 93 MG/DL (ref 65–99)
POTASSIUM SERPL-SCNC: 4.6 MMOL/L (ref 3.5–5.3)
SODIUM SERPL-SCNC: 139 MMOL/L (ref 135–146)

## 2025-03-19 ENCOUNTER — TELEPHONE (OUTPATIENT)
Dept: ADMINISTRATIVE | Facility: HOSPITAL | Age: 40
End: 2025-03-19

## 2025-03-19 DIAGNOSIS — N28.1 RENAL CYST, ACQUIRED, RIGHT: Primary | ICD-10-CM

## 2025-03-26 ENCOUNTER — TELEPHONE (OUTPATIENT)
Dept: UROLOGY | Facility: AMBULATORY SURGERY CENTER | Age: 40
End: 2025-03-26

## 2025-03-26 NOTE — TELEPHONE ENCOUNTER
Chart Review/Discussion with Reviewer    Ordering Provider: Ermelinda RITTER and Dr. Mayank Garcia  Image Requested: CT of the abdomen w/wo contrast  Image Denial reason: Not enough clinical information, MRI of the abdomen ordered by outside facility (patient is not planning to obtain MRI)  Alternate Image Suggested:none    Patient Insurance Co: [employer]    P2P completed via telephone    Outcome: Approved  Approval #s: [id number] valid through 3/19/2025-9/22/2025    Plan: Proceed with CT of the abdomen w/wo contrast

## 2025-04-03 NOTE — TELEPHONE ENCOUNTER
Called Valeri back and she states that originally Mri was requested - but got denied. And Ermelinda did the Peer to saad and got approved.  She state she did get approval for Mri and has decided to get that as that is what Chery originally  prescribed   [see HPI] : see HPI [Shortness Of Breath] : shortness of breath [Chest Pain] : chest pain [Cough] : cough [Wheezing] : wheezing [Negative] : Endocrine [Dyspnea on exertion] : not dyspnea during exertion [Chest  Pressure] : no chest pressure [Lower Ext Edema] : no extremity edema [Palpitations] : no palpitations [Easy Bleeding] : no tendency for easy bleeding [Easy Bruising] : no tendency for easy bruising

## 2025-04-03 NOTE — TELEPHONE ENCOUNTER
MRI abdomen w/wo contrast ordered by [facility] in [place] for Dx focal nodular hyperplasia of liver    Please advise if CT of abdomen is necessary and CB #[phone]     The CT is scheduled for today and patient needs to know soon if she has to cancel

## 2025-04-03 NOTE — TELEPHONE ENCOUNTER
Patient called to ask, if she should proceed with CT of abdomen w wo contrast. Patients GI doctor ordered MRI of liver. Dr. Garcia mentioned if she was having MRI, she did not need CT scan. Patient looking for clarification.     Please advise.     #[phone]

## 2025-04-28 ENCOUNTER — HOSPITAL ENCOUNTER (OUTPATIENT)
Dept: MAMMOGRAPHY | Facility: CLINIC | Age: 40
Discharge: HOME | End: 2025-04-28

## 2025-06-06 ENCOUNTER — TELEPHONE (OUTPATIENT)
Dept: SURGICAL ONCOLOGY | Facility: CLINIC | Age: 40
End: 2025-06-06

## 2025-06-06 NOTE — TELEPHONE ENCOUNTER
Left message for patient to reschedule follow up appointment with Dr. Cuevas. Left hopeline number for return call.